# Patient Record
Sex: MALE | Race: BLACK OR AFRICAN AMERICAN | Employment: OTHER | URBAN - METROPOLITAN AREA
[De-identification: names, ages, dates, MRNs, and addresses within clinical notes are randomized per-mention and may not be internally consistent; named-entity substitution may affect disease eponyms.]

---

## 2023-01-12 ENCOUNTER — HOSPITAL ENCOUNTER (OUTPATIENT)
Age: 83
Discharge: HOME | End: 2023-01-12
Payer: MEDICARE

## 2023-01-12 VITALS
SYSTOLIC BLOOD PRESSURE: 159 MMHG | RESPIRATION RATE: 18 BRPM | OXYGEN SATURATION: 98 % | HEART RATE: 86 BPM | TEMPERATURE: 98 F | DIASTOLIC BLOOD PRESSURE: 87 MMHG

## 2023-01-12 DIAGNOSIS — U07.1 COVID-19 VIRUS INFECTION: Primary | ICD-10-CM

## 2023-01-12 LAB — SARS-COV-2 RNA RESP QL NAA+PROBE: DETECTED

## 2023-01-12 PROCEDURE — 99202 OFFICE O/P NEW SF 15 MIN: CPT

## 2023-01-12 RX ORDER — AZELASTINE 1 MG/ML
SPRAY, METERED NASAL
COMMUNITY
Start: 2023-01-11

## 2023-01-12 RX ORDER — MOMETASONE FUROATE 1 MG/G
CREAM TOPICAL
COMMUNITY
Start: 2022-08-16

## 2023-01-12 RX ORDER — METFORMIN HYDROCHLORIDE 500 MG/1
TABLET, EXTENDED RELEASE ORAL
COMMUNITY
Start: 2022-11-29

## 2023-01-12 RX ORDER — GLIMEPIRIDE 2 MG/1
TABLET ORAL
COMMUNITY
Start: 2022-11-07

## 2023-01-12 RX ORDER — AMLODIPINE BESYLATE 10 MG/1
TABLET ORAL
COMMUNITY
Start: 2022-10-02

## 2023-01-12 RX ORDER — ATORVASTATIN CALCIUM 40 MG/1
TABLET, FILM COATED ORAL
COMMUNITY
Start: 2022-10-26

## 2023-01-12 RX ORDER — METOPROLOL SUCCINATE 50 MG/1
TABLET, EXTENDED RELEASE ORAL
COMMUNITY
Start: 2022-11-07

## 2023-01-12 NOTE — DISCHARGE INSTRUCTIONS
Wear a mask in public for another 3 days while around other people. May continue taking the cough medication your doctor gave you as needed. Return for new or worsening symptoms.

## 2023-01-12 NOTE — ED INITIAL ASSESSMENT (HPI)
Pt aox4. Pt to IC for Covid test. Pt states lives with daughter and daughter tested + for Covid. Pt is a poor historian. Pt states was treated for sore throat and cough 1 week ago.

## 2025-01-04 ENCOUNTER — HOSPITAL ENCOUNTER (OUTPATIENT)
Age: 85
Discharge: HOME | End: 2025-01-04
Attending: EMERGENCY MEDICINE
Payer: MEDICARE

## 2025-01-04 ENCOUNTER — APPOINTMENT (OUTPATIENT)
Dept: GENERAL RADIOLOGY | Age: 85
End: 2025-01-04
Attending: EMERGENCY MEDICINE
Payer: MEDICARE

## 2025-01-04 VITALS
RESPIRATION RATE: 20 BRPM | DIASTOLIC BLOOD PRESSURE: 84 MMHG | HEART RATE: 85 BPM | TEMPERATURE: 98 F | SYSTOLIC BLOOD PRESSURE: 185 MMHG | OXYGEN SATURATION: 98 %

## 2025-01-04 DIAGNOSIS — J11.1 INFLUENZA: Primary | ICD-10-CM

## 2025-01-04 LAB
POCT INFLUENZA A: POSITIVE
POCT INFLUENZA B: NEGATIVE
SARS-COV-2 RNA RESP QL NAA+PROBE: NOT DETECTED

## 2025-01-04 PROCEDURE — 87502 INFLUENZA DNA AMP PROBE: CPT | Performed by: EMERGENCY MEDICINE

## 2025-01-04 PROCEDURE — 71046 X-RAY EXAM CHEST 2 VIEWS: CPT | Performed by: EMERGENCY MEDICINE

## 2025-01-04 PROCEDURE — 99214 OFFICE O/P EST MOD 30 MIN: CPT

## 2025-01-04 RX ORDER — OSELTAMIVIR PHOSPHATE 75 MG/1
75 CAPSULE ORAL 2 TIMES DAILY
Qty: 10 CAPSULE | Refills: 0 | Status: SHIPPED | OUTPATIENT
Start: 2025-01-04 | End: 2025-01-09

## 2025-01-04 RX ORDER — BENZONATATE 100 MG/1
100 CAPSULE ORAL 3 TIMES DAILY PRN
Qty: 30 CAPSULE | Refills: 0 | Status: SHIPPED | OUTPATIENT
Start: 2025-01-04 | End: 2025-02-03

## 2025-01-04 NOTE — ED PROVIDER NOTES
Patient Seen in: [facility]      History     Chief Complaint   Patient presents with    Cough     Stated Complaint: Cough    Subjective:   HPI      The patient is a 84-year-old male presents immediate care with history of cough that has been ongoing for the last couple days.  The patient's wife is also been sick with similar symptoms at home.  Patient wife was here in the immediate care and was tested negative for COVID and flu at that time as per patient giving history.  Patient has had no history of any sore throat.  The patient denies history of any vomiting or diarrhea.  Patient eating normally at home.  Patient denies history of any other somatic complaints or discomfort at this time.  Patient states he is not a smoker.  Patient blood pressure somewhat elevated here in the immediate care he states he did not take his medication for blood pressure this morning yet.    Objective:     Past Medical History:    Atherosclerosis of coronary artery    Diabetes (HCC)    Essential hypertension    Hyperlipidemia              History reviewed. No pertinent surgical history.             Social History     Socioeconomic History    Marital status:    Tobacco Use    Smoking status: Unknown     Social Drivers of Health      Received from [facility]              Review of Systems    Positive for stated complaint: Cough  Other systems are as noted in HPI.  Constitutional and vital signs reviewed.      All other systems reviewed and negative except as noted above.    Physical Exam     ED Triage Vitals   BP 01/04/25 0848 (!) 204/86   Pulse 01/04/25 0848 85   Resp 01/04/25 0848 20   Temp 01/04/25 0848 97.7 °F (36.5 °C)   Temp src 01/04/25 0848 Oral   SpO2 01/04/25 0848 98 %   O2 Device 01/04/25 0850 None (Room air)       Current Vitals:   Vital Signs  BP: (!) 204/86 (pt did not take BP meds this AM)  Pulse: 85  Resp: 20  Temp: 97.7 °F (36.5 °C)  Temp src: Oral    Oxygen Therapy  SpO2: 98  %  O2 Device: None (Room air)        Physical Exam  GENERAL: Well-developed, well-nourished male sitting up breathing easily in no apparent distress.  Patient is nontoxic in appearance.  HEENT: Head is normocephalic, atraumatic. Pupils are 4 mm equally round and reactive to light. Oropharynx is clear. Mucous membranes are moist.  NECK: No stridor.  LUNGS: Clear to auscultation bilaterally with no wheeze. There is good equal air entry bilaterally.  HEART: Regular rate and rhythm. Normal S1, S2 no S3, or S4. No murmur.  NEURO: Patient is awake, alert and oriented to time place and person. Motor strength is 5 over 5 in all 4 extremities. There are no gross motor or sensory deficits appreciated.  Patient answering all questions appropriately is up and ambulatory in the immediate care with a steady gait and no ataxia.      ED Course     Labs Reviewed   POCT FLU TEST - Abnormal; Notable for the following components:       Result Value    POCT INFLUENZA A Positive (*)     All other components within normal limits    Narrative:     This assay is a rapid molecular in vitro test utilizing nucleic acid amplification of influenza A and B viral RNA.   RAPID SARS-COV-2 BY PCR - Normal     I personally reviewed the patient's chest x-ray images my individual interpretation shows no evidence of any acute infiltrate.  I also reviewed official radiology report which showed results as noted below.       XR CHEST PA + LAT CHEST (CPT=71046)    Result Date: 1/4/2025  CONCLUSION:  No acute abnormality is seen.   LOCATION:  [facility]   Dictated by (CST): Chepe Deleon MD on 1/04/2025 at 9:29 AM     Finalized by (CST): Chepe Deleon MD on 1/04/2025 at 9:29 AM             MDM     Patient had chest x-ray, COVID, flu testing here in the immediate care.  Patient is negative for COVID has a negative chest x-ray but is positive for influenza A.  Patient will be treated with cough medication and Tamiflu for home.  Patient instructed to encourage fluids and  rest at home.  Patient instructed to take Tylenol for any symptoms at home return to the ER immediately if symptoms worsen or if any other problems arise.  Patient discharged to home at this time        Medical Decision Making      Disposition and Plan     Clinical Impression:  1. Influenza         Disposition:  Discharge  1/4/2025  9:33 am    Follow-up:  Susan Forman MD  [address]  [facility] [address]  [phone]    In 2 days  or call your md for follow up          Medications Prescribed:  Current Discharge Medication List        START taking these medications    Details   oseltamivir (TAMIFLU) 75 MG Oral Cap Take 1 capsule (75 mg total) by mouth 2 (two) times daily for 5 days.  Qty: 10 capsule, Refills: 0      benzonatate 100 MG Oral Cap Take 1 capsule (100 mg total) by mouth 3 (three) times daily as needed for cough.  Qty: 30 capsule, Refills: 0                 Supplementary Documentation:

## 2025-01-04 NOTE — DISCHARGE INSTRUCTIONS
Tylenol for fever at home  Encourage fluids at home  Take your medication and monitor your blood pressure at home  Return to the ER if symptoms worsen or if any other problems arise

## 2025-01-04 NOTE — ED INITIAL ASSESSMENT (HPI)
Presents for cough for last few days. Wife also has cough at home. Denies fevers. No body aches, sore throat, or chills. No nausea, vomiting, or diarrhea.